# Patient Record
(demographics unavailable — no encounter records)

---

## 2025-04-06 NOTE — HISTORY OF PRESENT ILLNESS
[FreeTextEntry1] : 63-year-old female presents for visit secondary to left breast pain.  Patient states the breast pain started approximately 2 weeks ago.  The patient did notice that she was moving furniture and lifting more frequently as her daughter recently moved in with her.  She denies any breast masses.  No nipple discharge.  No skin changes.  No swollen lymph nodes.  She has no personal history of breast disease.  Family history is significant for a maternal grandmother, maternal aunt, and maternal cousin all with breast cancer over the age of 50.  The patient had genetic testing which was negative.